# Patient Record
(demographics unavailable — no encounter records)

---

## 2025-03-21 NOTE — HISTORY OF PRESENT ILLNESS
[de-identified] : GI Surgical Note   Service: Surgical oncology   Referral: [facility] dischargeg   Chief Complaint: [place]itanian 71 y/o speaking male admitted to [facility] ED with severe RUQ abdominal pain worsening with meals, n/v, pruritic, grey stools and weight loss of 10lbs.   Visit Details:  Accompanied by Preferred Language: Mauritanian    Interval History:  Allergies: NKA Past Medical History: Hep A, BPH Past Surgical History: None Social History: Denies Family History: Breast cancer, mother deseaced at 51 ECO   Imagin2025 MRCP- Intrahepatic and severe extrahepatic biliary duct dilatation due to a high-grade stricture in the mid CBD  2025 CT chest-  Multiple scattered indeterminate nodules in the right upper lobe, measuring up to 0.9 cm. 2025-ERCP with Dr. Warren- the proximal CBD was dilated, there was a mid-distal CBD stricture 3cm in length. A 10mx 6cm metal stent was placed into the bile duct.

## 2025-03-21 NOTE — CONSULT LETTER
[Dear  ___] : Dear  [unfilled], [Please see my note below.] : Please see my note below. [Sincerely,] : Sincerely, [FreeTextEntry3] :   Yuri Borden MD, GRAHAM, FACS Director of Surgical Oncology- [facility] , Department of Surgery [facility] at [place] [id number] Ph: [phone] Cell: [phone]

## 2025-03-21 NOTE — REVIEW OF SYSTEMS
[Negative] : Heme/Lymph Otc Regimen: Discussed risks and benefits of OTC sunscreen 30spf or greater Detail Level: Zone

## 2025-03-21 NOTE — ASSESSMENT
[FreeTextEntry1] : 69 y/o male presents for a surgical consultations s/p ED admission notable for bile duct mass and hyperbilirubinemia.  Imagin2025 MRCP- Intrahepatic and severe extrahepatic biliary duct dilatation due to a high-grade stricture in the mid CBD  2025 CT chest-  Multiple scattered indeterminate nodules in the right upper lobe, measuring up to 0.9 cm. 2025-ERCP with Dr. Warren- the proximal CBD was dilated, there was a mid-distal CBD stricture 3cm in length. A 10mx 6cm metal stent was placed into the bile duct.  2025 Bili 12.6 Ca 19-9- 67 CEA 3.2  2025 D/C 5.7   Plan: -[facility] recommendations as follow

## 2025-03-26 NOTE — REASON FOR VISIT
[Initial Consultation] : an initial consultation for [Bile Duct Mass] : bile duct mass [[facility] Referral] : [facility]

## 2025-03-26 NOTE — REASON FOR VISIT
[Initial Consultation] : an initial consultation for [Bile Duct Mass] : bile duct mass [[facility] Referral] : [facility]

## 2025-03-28 NOTE — HISTORY OF PRESENT ILLNESS
[FreeTextEntry1] : Pre procedure note for ercp  [de-identified] :   ERCP Report    Date: 3/14/2025 8:55 AM    Patient Name: LANCE COSTELLO    MRN: [id number]    Account Number:    [id number]    Gender: Male     (age): 8/15/1954 (70)    Instrument(s):    ERCP# [id number]    Attending/Fellow:    Sheryl Warren        Procedure Room #:    PROCEDURE ROOM 2            Nurse(s):    Isidro Stephens RN    ASA Class:    P2 - 3/14/2025 9:38 AM Sheryl Warren    History of Present Illness:    H&P was previously reviewed.    Administered Medications:    As per Anesthesiology Record    Indications:    Obstructive jaundice - K83.1    Procedure:    The procedure, indications, preparation and potential complications were  explained to the patient, who indicated understanding and signed the  corresponding consent forms. IV anesthesia was administered by anesthesiologist.  Continuous pulse oximetry and blood pressure monitoring were used throughout the  procedure. Supplemental oxygen was used. Patient was placed in prone position.  The duodenoscope was introduced through mouth and advanced under direct  visualization until ampulla was reached. Patient tolerance to the procedure was  excellent. The procedure was not difficult. Blood loss was none.    Limitations/Complications:    There were no apparent limitations or complications    Endoscopy Findings:    Retained gastric contents    ERCP Findings:    The duodenoscope was passed into the second portion of the duodenum. The major  papilla was identified and appeared unremarkable.Using a snare, the previously  placed stent was removed. Complete removal was confirmed on fluoroscopy. Using a  short tipped traction sphincterotome, the bile duct was readily cannulated. A  wire was passed into the bile duct, bile was aspirated and a cholangiogram was  produced by injecting contrast. A mid-distal cbd stricture was appreciated.  Using a 12mm balloon, the CBD was swept, sludge and debris were removed, no pus  found. There was resistance to passage of the 12mm balloon through the  stricture. An occlusion cholangiogram was performed, the proximal CBD was  dilated, there was a mid-distal CBD stricture 3cm in length. Due to the degree  of stricturing and failure to respond to a plastic stent a 10mx 6cm covered  metal stent was placed into the bile duct, the waist of the stricture was  appreciated on fluoroscopy. There was adequate bile drainage at the end of the  procedure. No PD cannulation was performed.    Fluoroscopic Interpretation:    I supervised the acquisition and interpretation of the fluoroscopic images at  the biliary tree. The quality of the images was good. The total fluoro time was  38s.    Impressions:    Biliary stricture.    Summary:    Stone/sludge removal    Biliary stent removed    Biliary stent placement    Plan:    Return to floor for further management    Advance diet as tolerated    Repeat ERCP in months for stent removal/exchange    Monitor LAES in response to stent placement    Sheryl Warren    Version 1, Electronically signed on 3/14/2025 9:46:21 AM by Sheryl Warren            Received for:[facility]  DOCUMENT MD  Mar 14 2025  9:47AM Eastern Standard Time

## 2025-03-28 NOTE — HISTORY OF PRESENT ILLNESS
[Abdominal Pain] : abdominal pain [Acholic stools] : acholic stools [Jaundice] : jaundice [Pruritus] : pruritus [Bile Duct Mass] : Bile Duct Mass [Fully active, able to carry on all pre-disease performance without restriction] : Status 0 - Fully active, able to carry on all pre-disease performance without restriction [TextBox_4] : 03/26/2025 [TextBox_6] : Bile duct mass [TextBox_23] : 67 [TextBox_21] : 3.2 [TextBox_39] : [id number] [TextBox_41] : BILE DUCT BRUSH  NEGATIVE FOR MALIGNANT CELLS. [TextBox_43] : 03/12/2025 [] : No [TextBox_5] : 03/26/2025 [TextBox_74] :  71 y/o Setsw[place] speaking male to [facility] ED with severe RUQ abdominal pain worsening with meals, n/v, pruritic, pale stools and weight loss of 10lbs. Pmhx- Hep A, BPH, family hx of breast cancer, mother  at age 51.   2025 MRCP- Intrahepatic and severe extrahepatic biliary duct dilatation due to a high-grade stricture in the mid CBD  2025 CT chest- Multiple scattered indeterminate nodules in the right upper lobe, measuring up to 0.9 cm. 2025-ERCP with Dr. Warren- the proximal CBD was dilated, there was a mid-distal CBD stricture 3cm in length, stent was placed, sludge was removed, and cytology was taken. 2025 Bili trended up, therefore ERCP repeated, and a metal stent was replaced into the bile duct.  BILE DUCT BRUSH NEGATIVE FOR MALIGNANT CELLS. [FreeTextEntry6] : -Mid-distal CBD stricture highly suspicious for malignant process -SpyGlass cholangioscopy with Dr. Leung to obtain more tissue and confirm malignant process. -PET scan to stage tumor and assess pulmonary nodules  -Potential Whipple after confirming malinancy

## 2025-03-28 NOTE — ASSESSMENT
[FreeTextEntry1] : Procedure risks and benefits reviewed with patient See anesthesia note for asa and mallampati

## 2025-03-28 NOTE — HISTORY OF PRESENT ILLNESS
[Abdominal Pain] : abdominal pain [Acholic stools] : acholic stools [Jaundice] : jaundice [Pruritus] : pruritus [Bile Duct Mass] : Bile Duct Mass [Fully active, able to carry on all pre-disease performance without restriction] : Status 0 - Fully active, able to carry on all pre-disease performance without restriction [TextBox_4] : 03/26/2025 [TextBox_6] : Bile duct mass [TextBox_23] : 67 [TextBox_21] : 3.2 [TextBox_39] : [id number] [TextBox_41] : BILE DUCT BRUSH  NEGATIVE FOR MALIGNANT CELLS. [TextBox_43] : 03/12/2025 [] : No [TextBox_5] : 03/26/2025 [TextBox_74] :  71 y/o Maltese speaking male to [facility] ED with severe RUQ abdominal pain worsening with meals, n/v, pruritic, pale stools and weight loss of 10lbs. Pmhx- Hep A, BPH, family hx of breast cancer, mother  at age 51.   2025 MRCP- Intrahepatic and severe extrahepatic biliary duct dilatation due to a high-grade stricture in the mid CBD  2025 CT chest- Multiple scattered indeterminate nodules in the right upper lobe, measuring up to 0.9 cm. 2025-ERCP with Dr. Warren- the proximal CBD was dilated, there was a mid-distal CBD stricture 3cm in length, stent was placed, sludge was removed, and cytology was taken. 2025 Bili trended up, therefore ERCP repeated, and a metal stent was replaced into the bile duct.  BILE DUCT BRUSH NEGATIVE FOR MALIGNANT CELLS. [FreeTextEntry6] : -Mid-distal CBD stricture highly suspicious for malignant process -SpyGlass cholangioscopy with Dr. Leung to obtain more tissue and confirm malignant process. -PET scan to stage tumor and assess pulmonary nodules  -Potential Whipple after confirming malinancy

## 2025-04-01 NOTE — HISTORY OF PRESENT ILLNESS
[de-identified] : GI Surgical Note   Service: Surgical oncology   Referral: [facility] dischargeg   Chief Complaint: Citizen of the [place] 71 y/o speaking male admitted to [facility] ED with severe RUQ abdominal pain worsening with meals, n/v, pruritic, grey stools and weight loss of 10lbs.   Visit Details:  Accompanied by Preferred Language: Citizen of the [place]    Interval History:  Allergies: NKA Past Medical History: Hep A, BPH Past Surgical History: None Social History: Denies Family History: Breast cancer, mother deseaced at 51 ECO   Imagin2025 MRCP- Intrahepatic and severe extrahepatic biliary duct dilatation due to a high-grade stricture in the mid CBD  2025 CT chest-  Multiple scattered indeterminate nodules in the right upper lobe, measuring up to 0.9 cm. 2025-ERCP with Dr. Warren- the proximal CBD was dilated, there was a mid-distal CBD stricture 3cm in length. A 10mx 6cm metal stent was placed into the bile duct.  2025- Pt presents to the office, jaundice has improved, reports feeling slightly better.

## 2025-04-01 NOTE — ASSESSMENT
[FreeTextEntry1] : 69 y/o male presents for a surgical consultations s/p ED admission notable for bile duct mass and hyperbilirubinemia.  Imagin2025 MRCP- Intrahepatic and severe extrahepatic biliary duct dilatation due to a high-grade stricture in the mid CBD  2025 CT chest- Multiple scattered indeterminate nodules in the right upper lobe, measuring up to 0.9 cm. 2025-ERCP with Dr. Leung- the proximal CBD was dilated, there was a mid-distal CBD stricture 3cm in length. A 10mx 6cm metal stent was placed into the bile duct.  2025 Bili 12.6 Ca 19-9- 67 CEA 3.2  2025 D/C 5.7   2025 BILI- 6 WBC 11.64  [facility]  -Mid-distal CBD stricture highly suspicious for malignant process -SpyGlass cholangioscopy with Dr. Leung to obtain more tissue and confirm malignant process. -PET scan to stage tumor and assess pulmonary nodules -Potential Whipple after confirming malignancy  Medial Malleolus/Left:

## 2025-04-01 NOTE — PHYSICAL EXAM
[FreeTextEntry1] :   COVID -19 precautions as per [facility] policy was universally followed.

## 2025-04-01 NOTE — PHYSICAL EXAM
[FreeTextEntry1] :   COVID -19 precautions as per [facility] policy was universally followed.

## 2025-04-01 NOTE — ASSESSMENT
[FreeTextEntry1] : 71 y/o male presents for a surgical consultations s/p ED admission notable for bile duct mass and hyperbilirubinemia.  Imagin2025 MRCP- Intrahepatic and severe extrahepatic biliary duct dilatation due to a high-grade stricture in the mid CBD  2025 CT chest- Multiple scattered indeterminate nodules in the right upper lobe, measuring up to 0.9 cm. 2025-ERCP with Dr. Leung- the proximal CBD was dilated, there was a mid-distal CBD stricture 3cm in length. A 10mx 6cm metal stent was placed into the bile duct.  2025 Bili 12.6 Ca 19-9- 67 CEA 3.2  2025 D/C 5.7   2025 BILI- 6 WBC 11.64  [facility]  -Mid-distal CBD stricture highly suspicious for malignant process -SpyGlass cholangioscopy with Dr. Leung to obtain more tissue and confirm malignant process. -PET scan to stage tumor and assess pulmonary nodules -Potential Whipple after confirming malignancy

## 2025-04-01 NOTE — ASSESSMENT
[FreeTextEntry1] : 71 y/o male presents for a surgical consultations s/p ED admission notable for bile duct mass and hyperbilirubinemia.  Imagin2025 MRCP- Intrahepatic and severe extrahepatic biliary duct dilatation due to a high-grade stricture in the mid CBD  2025 CT chest- Multiple scattered indeterminate nodules in the right upper lobe, measuring up to 0.9 cm. 2025-ERCP with Dr. Leung- the proximal CBD was dilated, there was a mid-distal CBD stricture 3cm in length. A 10mx 6cm metal stent was placed into the bile duct.  2025 Bili 12.6 Ca 19-9- 67 CEA 3.2  2025 D/C 5.7   2025 BILI- 6 WBC 11.64  [facility]  -Mid-distal CBD stricture highly suspicious for malignant process -SpyGlass cholangioscopy with Dr. Leung to obtain more tissue and confirm malignant process. -PET scan to stage tumor and assess pulmonary nodules -Potential Whipple after confirming malignancy

## 2025-04-01 NOTE — HISTORY OF PRESENT ILLNESS
[de-identified] : GI Surgical Note   Service: Surgical oncology   Referral: [facility] dischargeg   Chief Complaint: Congolese 71 y/o speaking male admitted to [facility] ED with severe RUQ abdominal pain worsening with meals, n/v, pruritic, grey stools and weight loss of 10lbs.   Visit Details:  Accompanied by Preferred Language: Congolese    Interval History:  Allergies: NKA Past Medical History: Hep A, BPH Past Surgical History: None Social History: Denies Family History: Breast cancer, mother deseaced at 51 ECO   Imagin2025 MRCP- Intrahepatic and severe extrahepatic biliary duct dilatation due to a high-grade stricture in the mid CBD  2025 CT chest-  Multiple scattered indeterminate nodules in the right upper lobe, measuring up to 0.9 cm. 2025-ERCP with Dr. Warren- the proximal CBD was dilated, there was a mid-distal CBD stricture 3cm in length. A 10mx 6cm metal stent was placed into the bile duct.  2025- Pt presents to the office, jaundice has improved, reports feeling slightly better.

## 2025-04-01 NOTE — CONSULT LETTER
[Consult Letter:] : I had the pleasure of evaluating your patient, [unfilled]. [( Thank you for referring [unfilled] for consultation for _____ )] : Thank you for referring [unfilled] for consultation for [unfilled] [FreeTextEntry3] :   Yuri Borden MD, GRAHAM, FACS Director of Surgical Oncology- [facility] , Department of Surgery [facility] at [place] [id number] Ph: [phone] Cell: [phone]

## 2025-04-01 NOTE — CONSULT LETTER
[Consult Letter:] : I had the pleasure of evaluating your patient, [unfilled]. [( Thank you for referring [unfilled] for consultation for _____ )] : Thank you for referring [unfilled] for consultation for [unfilled] [FreeTextEntry3] :   Yuri Borden MD, GRAHAM, FACS Director of Surgical Oncology- [facility] , Department of Surgery [facility] at [place] [id number] Ph: [phone] Cell: [phone]

## 2025-04-01 NOTE — HISTORY OF PRESENT ILLNESS
[de-identified] : GI Surgical Note   Service: Surgical oncology   Referral: [facility] dischargeg   Chief Complaint: Greek 71 y/o speaking male admitted to [facility] ED with severe RUQ abdominal pain worsening with meals, n/v, pruritic, grey stools and weight loss of 10lbs.   Visit Details:  Accompanied by Preferred Language: Greek    Interval History:  Allergies: NKA Past Medical History: Hep A, BPH Past Surgical History: None Social History: Denies Family History: Breast cancer, mother deseaced at 51 ECO   Imagin2025 MRCP- Intrahepatic and severe extrahepatic biliary duct dilatation due to a high-grade stricture in the mid CBD  2025 CT chest-  Multiple scattered indeterminate nodules in the right upper lobe, measuring up to 0.9 cm. 2025-ERCP with Dr. Warren- the proximal CBD was dilated, there was a mid-distal CBD stricture 3cm in length. A 10mx 6cm metal stent was placed into the bile duct.  2025- Pt presents to the office, jaundice has improved, reports feeling slightly better.

## 2025-04-01 NOTE — PHYSICAL EXAM
[FreeTextEntry1] :   COVID -19 precautions as per [facility] policy was universally followed.

## 2025-04-01 NOTE — CONSULT LETTER
[Consult Letter:] : I had the pleasure of evaluating your patient, [unfilled]. [( Thank you for referring [unfilled] for consultation for _____ )] : Thank you for referring [unfilled] for consultation for [unfilled] [FreeTextEntry3] :   Yuri Borden MD, GRAHAM, FACS Director of Surgical Oncology- [facility] , Department of Surgery [facility] at [place] [id number] Ph: [phone] Cell: [phone]

## 2025-04-01 NOTE — ASSESSMENT
[FreeTextEntry1] : 71 y/o male presents for a surgical consultations s/p ED admission notable for bile duct mass and hyperbilirubinemia.  Imagin2025 MRCP- Intrahepatic and severe extrahepatic biliary duct dilatation due to a high-grade stricture in the mid CBD  2025 CT chest- Multiple scattered indeterminate nodules in the right upper lobe, measuring up to 0.9 cm. 2025-ERCP with Dr. Leung- the proximal CBD was dilated, there was a mid-distal CBD stricture 3cm in length. A 10mx 6cm metal stent was placed into the bile duct.  2025 Bili 12.6 Ca 19-9- 67 CEA 3.2  2025 D/C 5.7   2025 BILI- 6 WBC 11.64  Plan: [facility]  -Mid-distal CBD stricture highly suspicious for malignant process -SpyGlass cholangioscopy with Dr. Leung to obtain more tissue and confirm malignant process. -PET scan to stage tumor and assess pulmonary nodules -Potential Whipple after confirming malignancy

## 2025-04-01 NOTE — HISTORY OF PRESENT ILLNESS
[de-identified] : GI Surgical Note   Service: Surgical oncology   Referral: [facility] dischargeg   Chief Complaint: Kittitian 69 y/o speaking male admitted to [facility] ED with severe RUQ abdominal pain worsening with meals, n/v, pruritic, grey stools and weight loss of 10lbs.   Visit Details:  Accompanied by Preferred Language: Kittitian    Interval History:  Allergies: NKA Past Medical History: Hep A, BPH Past Surgical History: None Social History: Denies Family History: Breast cancer, mother deseaced at 51 ECO   Imagin2025 MRCP- Intrahepatic and severe extrahepatic biliary duct dilatation due to a high-grade stricture in the mid CBD  2025 CT chest-  Multiple scattered indeterminate nodules in the right upper lobe, measuring up to 0.9 cm. 2025-ERCP with Dr. Warren- the proximal CBD was dilated, there was a mid-distal CBD stricture 3cm in length. A 10mx 6cm metal stent was placed into the bile duct.  2025- Pt presents to the office, jaundice has improved, reports feeling slightly better.

## 2025-04-01 NOTE — PHYSICAL EXAM
[FreeTextEntry1] :   COVID -19 precautions as per [facility] policy was universally followed.

## 2025-04-01 NOTE — CONSULT LETTER
[Consult Letter:] : I had the pleasure of evaluating your patient, [unfilled]. [( Thank you for referring [unfilled] for consultation for _____ )] : Thank you for referring [unfilled] for consultation for [unfilled] [FreeTextEntry3] :   Yuri Borden MD, GRAHAM, FACS Director of Surgical Oncology- [facility] , Department of Surgery [facility] at [place] [id number] Ph: [phone] Cell: [phone]

## 2025-04-04 NOTE — HISTORY OF PRESENT ILLNESS
[de-identified] : GI Surgical Note   Service: Surgical oncology   Referral: [facility] discharge   Chief Complaint: Puerto [place]ican 71 y/o speaking male admitted to [facility] ED with severe RUQ abdominal pain worsening with meals, n/v, pruritic, grey stools and weight loss of 10lbs.   Visit Details:  Accompanied by Preferred Language: [place] Rican    Interval History:  Allergies: NKA Past Medical History: Hep A, BPH Past Surgical History: None Social History: Denies Family History: Breast cancer, mother deseaced at 51 ECO   Imagin2025 MRCP- Intrahepatic and severe extrahepatic biliary duct dilatation due to a high-grade stricture in the mid CBD  2025 CT chest- Multiple scattered indeterminate nodules in the right upper lobe, measuring up to 0.9 cm. 2025-ERCP with Dr. Warren- the proximal CBD was dilated, there was a mid-distal CBD stricture 3cm in length. A 10mx 6cm metal stent was placed into the bile duct.  2025- Pt presents to the office, jaundice has improved, reports feeling slightly better.

## 2025-04-04 NOTE — CONSULT LETTER
[Dear  ___] : Dear  [unfilled], [Please see my note below.] : Please see my note below. [Sincerely,] : Sincerely, [FreeTextEntry3] :   Yuri Borden MD, GRAHAM, FACS Director of Surgical Oncology- [facility] , Department of Surgery [facility] at [place] [id number] Ph: [phone] Cell: [phone]

## 2025-04-04 NOTE — ASSESSMENT
[FreeTextEntry1] : 69 y/o male presents for a surgical consultations s/p ED admission notable for bile duct mass and hyperbilirubinemia. 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  Imagin2025 MRCP- Intrahepatic and severe extrahepatic biliary duct dilatation due to a high-grade stricture in the mid CBD  2025 CT chest- Multiple scattered indeterminate nodules in the right upper lobe, measuring up to 0.9 cm. 2025-ERCP with Dr. Warren- the proximal CBD was dilated, there was a mid-distal CBD stricture 3cm in length. A 10mx 6cm metal stent was placed into the bile duct.  2025 Bili 12.6 Ca 19-9- 67 CEA 3.2  2025 D/C 5.7   2025 BILI- 6 WBC 11.64  2025 ERCP with Dr. Warren- Biliary stent removed and sent for cytology Biliary stent placement Bile duct brushed for cytology Cholangioscopy with biliary stricture biopsies Path- NEGATIVE FOR MALIGNANT CELLS. MID CBD PATH- At least high-grade dysplasia, suspicious for adenocarcinoma in background of ulceration.  2025- PET (NW)-  Trace uptake at hepatobiliary stent, most likely due to foreign body reactive change. Pneumobilia is present.   Lymph node uptake overlying a distal RIGHT ureteral calculus; consider sonogram or CT urogram for further evaluation. Mild uptake at RIGHT hilum, nonspecific and likely reactive.  Plan: -Whipple with Dr. Garcia

## 2025-04-04 NOTE — CONSULT LETTER
[Dear  ___] : Dear  [unfilled], [Please see my note below.] : Please see my note below. [Sincerely,] : Sincerely, [FreeTextEntry3] :   Yuri Borden MD, GRAHAM, FACS Director of Surgical Oncology- [facility] , Department of Surgery [facility] at [place] [id number] Ph: [phone] Cell: [phone]

## 2025-04-04 NOTE — PHYSICAL EXAM
[FreeTextEntry1] :   COVID -19 precautions as per [facility] policy was universally followed.

## 2025-04-04 NOTE — HISTORY OF PRESENT ILLNESS
[de-identified] : GI Surgical Note   Service: Surgical oncology   Referral: [facility] discharge   Chief Complaint: Chadian 69 y/o speaking male admitted to [facility] ED with severe RUQ abdominal pain worsening with meals, n/v, pruritic, grey stools and weight loss of 10lbs.   Visit Details:  Accompanied by Preferred Language: Chadian    Interval History:  Allergies: NKA Past Medical History: Hep A, BPH Past Surgical History: None Social History: Denies Family History: Breast cancer, mother deseaced at 51 ECO   Imagin2025 MRCP- Intrahepatic and severe extrahepatic biliary duct dilatation due to a high-grade stricture in the mid CBD  2025 CT chest- Multiple scattered indeterminate nodules in the right upper lobe, measuring up to 0.9 cm. 2025-ERCP with Dr. Warren- the proximal CBD was dilated, there was a mid-distal CBD stricture 3cm in length. A 10mx 6cm metal stent was placed into the bile duct.  2025- Pt presents to the office, jaundice has improved, reports feeling slightly better.

## 2025-04-04 NOTE — HISTORY OF PRESENT ILLNESS
[de-identified] : GI Surgical Note   Service: Surgical oncology   Referral: [facility] discharge   Chief Complaint: Gambian 69 y/o speaking male admitted to [facility] ED with severe RUQ abdominal pain worsening with meals, n/v, pruritic, grey stools and weight loss of 10lbs.   Visit Details:  Accompanied by Preferred Language: Gambian    Interval History:  Allergies: NKA Past Medical History: Hep A, BPH Past Surgical History: None Social History: Denies Family History: Breast cancer, mother deseaced at 51 ECO   [id number] MRCP- Intrahepatic and severe extrahepatic biliary duct dilatation due to a high-grade stricture in the mid CBD  2025 CT chest- Multiple scattered indeterminate nodules in the right upper lobe, measuring up to 0.9 cm. 2025-ERCP with Dr. Warren- the proximal CBD was dilated, there was a mid-distal CBD stricture 3cm in length. A 10mx 6cm metal stent was placed into the bile duct.  2025- Pt presents to the office, jaundice has improved, reports feeling slightly better.

## 2025-04-04 NOTE — CONSULT LETTER
[Dear  ___] : Dear  [unfilled], [Please see my note below.] : Please see my note below. [Sincerely,] : Sincerely, [FreeTextEntry3] :   Yuri Bordne MD, GRAHAM, FACS Director of Surgical Oncology- [facility] , Department of Surgery [facility] at [place] [id number] Ph: [phone] Cell: [phone]

## 2025-04-04 NOTE — PHYSICAL EXAM
[FreeTextEntry1] :   COVID -19 precautions as per [facility] policy was universally followed.

## 2025-04-08 NOTE — HISTORY OF PRESENT ILLNESS
[de-identified] : Mr. LANCE COSTELLO is a 70-year-old man, referred by Dr. Yuri Borden for consultation regarding a bile duct mass, here for an initial visit. Primarily Tajik speaking.  [facility] abd pain.   PMH/PSH relatively insignificant, BPH no significant Fam or social HX ECOG 0   MR/MRCP 3/2025 - intrahepatic & severe extra hepatic biliary duct dilatation due to a high-grade stricture in the mid CBD at the entrance into the pancreatic head w/ concentric biliary ductal wall thickening c/f neoplastic process unless proven otherwise *primary concern is cholangiocellular carcinoma & pancreas neoplasm as the differentials, although no discrete pancreas mass is visualized. a benign biliary stricture is a differential consideration, including IgG4 disease -> ERCP & EUS recommended - 2 pancreas cysts in the head & uncinate are not directly associated w/ the stricture  CT chest 3/2025 - multiple scattered indeterminate nodules in the RUL, up to 9 mm - no change in the intra & extra hepatic biliary ductal dilation w/ abrupt transition point at the level of the pancreatic head  ERCP/EUS 3/12/2025 (DR. Sheryl Warren) - biliary tree stricture  - biliary stent placement   CT A/P - post ERCP 3/13/2025 - s/p placement of CBD stent, intrahepatic bile duct dilatation has decreased, no pneumobilia - persistent dilatation of the CBD above a narrowing, currently up to 2 cm, previously 2.7 cm. proximal tip of the stent may be embedded within the liver parenchyma at the confluence of the right & left main duct, distal end of the stent is within the duodenum  repeat EGD 3/14 w/ stent placement  ERCP 3/28/2025 (Dr. Sheryl Warren) - biliary stent removed & sent for cytology - covered METAL stent placed to traverse stricture - bile duct brushings done & biliary stricture bx   **CBD biopsy shows at least high-grade dysplasia, suspicious for adeno in background of ulceration  PET/CT 4/2025 - trace uptake at hepatobiliary stent, most likely due to foreign body reactive change. pmeunobilia is present, likely postprocedural - LN uptake overlying a distal RIGHT ureteral calculus -> US or CT urogram recommended - mild uptake at the RIGHT hilum, nonspecific & likely reactive

## 2025-04-08 NOTE — HISTORY OF PRESENT ILLNESS
[de-identified] : Mr. LANCE COSTELLO is a 70-year-old man, referred by Dr. Yuri Borden for consultation regarding a bile duct mass, here for an initial visit. Primarily Swazi speaking.  Initially presented to the ED @ [facility] w/ worsening RUQ abd pain.   PMH/PSH relatively insignificant, BPH no significant Fam or social HX ECOG 0   MR/MRCP 3/2025 - intrahepatic & severe extra hepatic biliary duct dilatation due to a high-grade stricture in the mid CBD at the entrance into the pancreatic head w/ concentric biliary ductal wall thickening c/f neoplastic process unless proven otherwise *primary concern is cholangiocellular carcinoma & pancreas neoplasm as the differentials, although no discrete pancreas mass is visualized. a benign biliary stricture is a differential consideration, including IgG4 disease -> ERCP & EUS recommended - 2 pancreas cysts in the head & uncinate are not directly associated w/ the stricture  CT chest 3/2025 - multiple scattered indeterminate nodules in the RUL, up to 9 mm - no change in the intra & extra hepatic biliary ductal dilation w/ abrupt transition point at the level of the pancreatic head  ERCP/EUS 3/12/2025 (DR. Sheryl Warren) - biliary tree stricture  - biliary stent placement   CT A/P - post ERCP 3/13/2025 - s/p placement of CBD stent, intrahepatic bile duct dilatation has decreased, no pneumobilia - persistent dilatation of the CBD above a narrowing, currently up to 2 cm, previously 2.7 cm. proximal tip of the stent may be embedded within the liver parenchyma at the confluence of the right & left main duct, distal end of the stent is within the duodenum  repeat EGD 3/14 w/ stent placement  ERCP 3/28/2025 (Dr. Sheryl Warren) - biliary stent removed & sent for cytology - covered METAL stent placed to traverse stricture - bile duct brushings done & biliary stricture bx   **CBD biopsy shows at least high-grade dysplasia, suspicious for adeno in background of ulceration  PET/CT 4/2025 - trace uptake at hepatobiliary stent, most likely due to foreign body reactive change. pmeunobilia is present, likely postprocedural - LN uptake overlying a distal RIGHT ureteral calculus -> US or CT urogram recommended - mild uptake at the RIGHT hilum, nonspecific & likely reactive

## 2025-04-09 NOTE — HISTORY OF PRESENT ILLNESS
[TextBox_4] : 03/26/2025 [TextBox_6] : Bile duct mass [TextBox_23] : 67 [TextBox_21] : 3.2 [TextBox_39] : [id number] [TextBox_41] : BILE DUCT BRUSH  NEGATIVE FOR MALIGNANT CELLS. [TextBox_43] : 03/12/2025 [Before Surgery] : before surgery [Resectable] : Resectable [] : No [Other: ___] : Other: [unfilled] [Surgical Resection - Curative Intent] : Surgical Resection - Curative Intent [Fully active, able to carry on all pre-disease performance without restriction] : Status 0 - Fully active, able to carry on all pre-disease performance without restriction [TextBox_5] : 04/08/2025 [TextBox_74] : 03/28/2025 Repeat ERCP- Re-stented and rebiopsied.  Mid CBD BX Path- At least high-grade dysplasia, suspicious for adenocarcinoma in background of ulceration.  04/01/2025-PET Lymph node uptake overlying a distal RIGHT ureteral calculus; consider sonogram or CT urogram for further evaluation.  Mild uptake at RIGHT hilum, nonspecific and likely reactive. [FreeTextEntry6] : -Biopsy proven high grade dysplasia very suspicious for adenocarcinoma -Whipple procedure with Dr. Garcia in one month once inflammation has improved.

## 2025-06-13 NOTE — ASSESSMENT
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

## 2025-06-13 NOTE — ASSESSMENT
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

## 2025-06-13 NOTE — PHYSICAL EXAM
[Normal] : well developed, well nourished, in no acute distress [de-identified] : midline incision healing well

## 2025-06-13 NOTE — PHYSICAL EXAM
[Normal] : well developed, well nourished, in no acute distress [de-identified] : midline incision healing well

## 2025-06-13 NOTE — HISTORY OF PRESENT ILLNESS
[de-identified] : Mr. LANCE COSTELLO is a 70-year-old man, referred by Dr. Yuri Borden for consultation regarding a bile duct mass, now s/p Whipple on 5/9/2025, here for a post-op visit.  Primarily Peruvian speaking.  Initially presented to the ED @ [facility] w/ worsening RUQ abd pain.   PMH/PSH relatively insignificant, BPH no significant Fam or social HX ECOG 0   MR/MRCP 3/2025 - intrahepatic & severe extra hepatic biliary duct dilatation due to a high-grade stricture in the mid CBD at the entrance into the pancreatic head w/ concentric biliary ductal wall thickening c/f neoplastic process unless proven otherwise *primary concern is cholangiocellular carcinoma & pancreas neoplasm as the differentials, although no discrete pancreas mass is visualized. a benign biliary stricture is a differential consideration, including IgG4 disease -> ERCP & EUS recommended - 2 pancreas cysts in the head & uncinate are not directly associated w/ the stricture  CT chest 3/2025 - multiple scattered indeterminate nodules in the RUL, up to 9 mm - no change in the intra & extra hepatic biliary ductal dilation w/ abrupt transition point at the level of the pancreatic head  ERCP/EUS 3/12/2025 (DR. Sheryl Warren) - biliary tree stricture  - biliary stent placement   CT A/P - post ERCP 3/13/2025 - s/p placement of CBD stent, intrahepatic bile duct dilatation has decreased, no pneumobilia - persistent dilatation of the CBD above a narrowing, currently up to 2 cm, previously 2.7 cm. proximal tip of the stent may be embedded within the liver parenchyma at the confluence of the right & left main duct, distal end of the stent is within the duodenum  repeat EGD 3/14 w/ stent placement  ERCP 3/28/2025 (Dr. Sheryl Warren) - biliary stent removed & sent for cytology - covered METAL stent placed to traverse stricture - bile duct brushings done & biliary stricture bx   **CBD biopsy shows at least high-grade dysplasia, suspicious for adeno in background of ulceration  PET/CT 4/2025 - trace uptake at hepatobiliary stent, most likely due to foreign body reactive change. 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

## 2025-06-13 NOTE — HISTORY OF PRESENT ILLNESS
[de-identified] : Mr. LANCE COSTELLO is a 70-year-old man, referred by Dr. Yuri Borden for consultation regarding a bile duct mass, now s/p Whipple on 5/9/2025, here for a post-op visit.  Primarily Papua [place] Guinean speaking.  Initially presented to the ED @ [facility] w/ worsening RUQ abd pain.   PMH/PSH relatively insignificant, BPH no significant Fam or social HX ECOG 0   MR/MRCP 3/2025 - intrahepatic & severe extra hepatic biliary duct dilatation due to a high-grade stricture in the mid CBD at the entrance into the pancreatic head w/ concentric biliary ductal wall thickening c/f neoplastic process unless proven otherwise *primary concern is cholangiocellular carcinoma & pancreas neoplasm as the differentials, although no discrete pancreas mass is visualized. a benign biliary stricture is a differential consideration, including IgG4 disease -> ERCP & EUS recommended - 2 pancreas cysts in the head & uncinate are not directly associated w/ the stricture  CT chest 3/2025 - multiple scattered indeterminate nodules in the RUL, up to 9 mm - no change in the intra & extra hepatic biliary ductal dilation w/ abrupt transition point at the level of the pancreatic head  ERCP/EUS 3/12/2025 (DR. Sheryl Warren) - biliary tree stricture  - biliary stent placement   CT A/P - post ERCP 3/13/2025 - s/p placement of CBD stent, intrahepatic bile duct dilatation has decreased, no pneumobilia - persistent dilatation of the CBD above a narrowing, currently up to 2 cm, previously 2.7 cm. proximal tip of the stent may be embedded within the liver parenchyma at the confluence of the right & left main duct, distal end of the stent is within the duodenum  repeat EGD 3/14 w/ stent placement  ERCP 3/28/2025 (Dr. Sheryl Warren) - biliary stent removed & sent for cytology - covered METAL stent placed to traverse stricture - bile duct brushings done & biliary stricture bx   **CBD biopsy shows at least high-grade dysplasia, suspicious for adeno in background of ulceration  PET/CT 4/2025 - trace uptake at hepatobiliary stent, most likely due to foreign body reactive change. 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

## 2025-07-07 NOTE — HISTORY OF PRESENT ILLNESS
[FreeTextEntry1] : Initially presented to the ED at [facility] with increasing RUQ abdominal pain followed by choluria, jaundice, decreased appetite and weight loss. Total bilirubin was 10.8, direct bilirubin- 10mg/dl. CA 19-9= 67 U/ml MR/MRCP 3/2025 - intrahepatic & severe extra hepatic biliary duct dilatation due to a high-grade stricture in the mid CBD at the entrance into the pancreatic head w/ concentric biliary ductal wall thickening c/f neoplastic process unless proven otherwise *Primary concern is cholangiocellular carcinoma & pancreas neoplasm as the differentials, although no discrete pancreas mass is visualized. a benign biliary stricture is a differential consideration, including IgG4 disease -> ERCP & EUS recommended - 2 pancreas cysts in the head & uncinate are not directly associated w/ the stricture. CT chest 3/2025 - multiple scattered indeterminate nodules in the RUL, up to 9 mm - no change in the intra & extra hepatic biliary ductal dilation w/ abrupt transition point at the level of the pancreatic head. ERCP/EUS 3/12/2025 (DR. Sheryl Warren) - biliary tree stricture - biliary stent placement CT A/P - post ERCP 3/13/2025 - s/p placement of CBD stent, intrahepatic bile duct dilatation has decreased, no pneumobilia - persistent dilatation of the CBD above a narrowing, currently up to 2 cm, previously 2.7 cm. proximal tip of the stent may be embedded within the liver parenchyma at the confluence of the right & left main duct, distal end of the stent is within the duodenum repeat EGD 3/14 w/ stent placement ERCP 3/28/2025 (Dr. Sheryl Warren) - biliary stent removed & sent for cytology - covered METAL stent placed to traverse stricture - bile duct brushings done & biliary stricture bx **CBD biopsy shows at least high-grade dysplasia, suspicious for adeno in background of ulceration  PET/CT 4/2025 - trace uptake at hepatobiliary stent, most likely due to foreign body reactive change. 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 patient has lost total 25 lbs since diagnosis. Appetite improving, gained 5 lbs since surgery. Tolerating small frequent feedings.

## 2025-07-07 NOTE — VITALS
[Maximal Pain Intensity: 1/10] : 1/10 [Least Pain Intensity: 0/10] : 0/10 [70: Cares for self; unalbe to carry on normal activity or do active work.] : 70: Cares for self; unable to carry on normal activity or do active work. [2 - Distress Level] : Distress Level: 2

## 2025-07-07 NOTE — REVIEW OF SYSTEMS
[Fatigue] : fatigue [Recent Change In Weight] : ~T recent weight change [Vomiting] : no vomiting [Constipation] : no constipation [Diarrhea] : no diarrhea [Negative] : Musculoskeletal

## 2025-07-07 NOTE — PHYSICAL EXAM
[FreeTextEntry1] : Pleasant male comes with wife. No jaundice. No adenopathy. Lungs clear. Abdomen soft. No hepatosplenomegaly. Incision well healed.

## 2025-07-23 NOTE — DISCUSSION/SUMMARY
[FreeTextEntry1] : REASON FOR CONSULT Mendoza Latif is a 70-year-old male who was referred by ERIC Cohn for cancer genetic counseling and risk assessment due to a personal history of cancer and a family history of cancer. He was accompanied by his wife. This consultation was carried out with the aid of a  #[id number].  RELEVANT MEDICAL HISTORY Mr. Latif was recently diagnosed with a cholangiocarcinoma in 03/2025 at 70 years old. 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 Recent PET/CT scan performed in 04/2025 revealed trace uptake at hepatobiliary stent, most likely due to foreign body reactive change, pneumobilia is present (likely procedural), no other suspicious findings within the hepatobiliary tree (attention to follow-up), lymph node uptake overlying a distal right ureteral calculus (consider sonogram or CT urogram for further evaluation), and mild uptake at right hilum, nonspecific and likely reactive. He was treated with a Whipple and plans on starting chemotherapy and radiation therapy.  Of note, [place] [id number] somatic tumor testing noted genomic alterations and variants of unknown significance (VUS) in several genes, including: TP53 (0.1% VAF). Tumor was noted to be microsatellite stable.   OTHER MEDICAL AND SURGICAL HISTORY:  -	 Medical History: glaucoma, prediabetes  -	Surgical History: prostate surgery (due to enlarged prostate), hip replacement, Whipple (05/2025)    CANCER SCREENING HISTORY:    Colon: -	Colonoscopy: last 2017, reportedly normal  -	Upper Endoscopy:  last 03/2025 for mid common bile duct biopsy, pathology revealed at least high-grade dysplasia, suspicious for adenocarcinoma in background of ulceration.  Prostate: -	PSA: No exams recently.  Skin:   -	FBSE: No -	Lesions biopsied/removed: No  SOCIAL HISTORY: -	Tobacco-product use: Yes, former (quit 25 years ago)  FAMILY HISTORY: Maternal ancestry and paternal ancestry were reported as Fijian. Ashkenazi Protestant ancestry was denied. Patient reports that his parents are first cousins. A detailed family history of cancer was ascertained. Relevant diagnoses are detailed below and in the scanned pedigree.   To Mr. Latif's knowledge, no one in the family has had germline testing for cancer susceptibility.   	 	RISK ASSESSMENT: Mr. Latif's personal history of cholangiocarcinoma and/or family history of breast cancer and stomach cancer is suggestive of an inherited predisposition to gastrointestinal cancers and/or breast cancer and related cancers. We recommended genetic testing for genes associated with pancreatic cancer, stomach cancer, breast cancer, and gynecological cancers as well as the BAP1 gene.   We discussed the risks, benefits and limitations, and implications of genetic testing. We also discussed the psychosocial implications of genetic testing. Possible test results were reviewed with Mr. Latif, along with associated medical management options. We discussed with Mr. Latif that he does not meet Medicare's hereditary cancer genetic testing criteria at this time. We discussed other options for pursuing genetic testing via the self-pay option ($249) offered at [facility].  Following our discussion, Mr. Latif deferred genetic testing today. He stated that he would like to think further about the cost and the option of genetic testing prior to proceeding. He was made aware that we remain available to him at any point in the future should he have any additional questions or would like to pursue genetic testing.   PLAN:  1.	Mr. Latif deferred genetic testing today. He will recontact Cancer Genetics if he would like to pursue genetic testing.   For any additional questions please call Cancer [facility]etics at [phone].    Debora Sorensen MS, [facility] Genetic Counselor, Cancer Genetics   CC:  ERIC Cohn